# Patient Record
Sex: FEMALE | Race: BLACK OR AFRICAN AMERICAN | NOT HISPANIC OR LATINO | Employment: UNEMPLOYED | URBAN - METROPOLITAN AREA
[De-identification: names, ages, dates, MRNs, and addresses within clinical notes are randomized per-mention and may not be internally consistent; named-entity substitution may affect disease eponyms.]

---

## 2022-11-24 ENCOUNTER — HOSPITAL ENCOUNTER (EMERGENCY)
Facility: HOSPITAL | Age: 22
Discharge: HOME | End: 2022-11-24
Attending: EMERGENCY MEDICINE
Payer: MEDICAID

## 2022-11-24 VITALS
HEIGHT: 65 IN | HEART RATE: 71 BPM | RESPIRATION RATE: 18 BRPM | OXYGEN SATURATION: 99 % | TEMPERATURE: 99 F | WEIGHT: 216.38 LBS | DIASTOLIC BLOOD PRESSURE: 68 MMHG | BODY MASS INDEX: 36.05 KG/M2 | SYSTOLIC BLOOD PRESSURE: 139 MMHG

## 2022-11-24 DIAGNOSIS — S61.215A LACERATION OF LEFT RING FINGER WITHOUT FOREIGN BODY WITHOUT DAMAGE TO NAIL, INITIAL ENCOUNTER: Primary | ICD-10-CM

## 2022-11-24 PROCEDURE — 12001 RPR S/N/AX/GEN/TRNK 2.5CM/<: CPT

## 2022-11-24 PROCEDURE — 99283 EMERGENCY DEPT VISIT LOW MDM: CPT | Mod: 25

## 2022-11-24 PROCEDURE — 25000003 PHARM REV CODE 250: Performed by: NURSE PRACTITIONER

## 2022-11-24 RX ADMIN — Medication 1.5 ML: at 12:11

## 2022-11-24 NOTE — ED NOTES
Non-adherent dressing placed and covered with coban. Extra dressing materials provided to pt. DC indicated per MD. DC instructions explained to pt., who verbalized understanding. NAD, VSS, resp. E/u. AAOx4. Ambulatory out of ED with even, steady gait. Copy of DC instructions provided to registration team member to give to patient with checkout. Pt. At registration desk for checkout.

## 2022-11-24 NOTE — ED PROVIDER NOTES
HISTORY     Chief Complaint   Patient presents with    Laceration     Lac noted to tip of L 4th finger, cut with knife, bleeding controlled in triage      Review of patient's allergies indicates:  No Known Allergies     HPI   The history is provided by the patient. No  was used.   Laceration   The incident occurred just prior to arrival. Pain location: Distal aspect of left ring finger. Size: 0.25. The pain is at a severity of 3/10. The pain has been Constant since onset. She reports no foreign bodies present. Her tetanus status is UTD.    Patient reports she was cutting yams for Thanksgiving dinner when she accidentally cut the distal aspect of her left index finger  PCP: Kurt Hollingsworth Ii, MD     Past Medical History:  No past medical history on file.     Past Surgical History:  Past Surgical History:   Procedure Laterality Date    None          Family History:  Family History   Problem Relation Age of Onset    Hypertension Unknown         Social History:  Social History     Tobacco Use    Smoking status: Never    Smokeless tobacco: Not on file   Substance and Sexual Activity    Alcohol use: No    Drug use: No    Sexual activity: Not on file         ROS   Review of Systems   Constitutional:  Negative for fever.   HENT:  Negative for sore throat.    Respiratory:  Negative for shortness of breath.    Cardiovascular:  Negative for chest pain.   Gastrointestinal:  Negative for nausea.   Genitourinary:  Negative for dysuria.   Musculoskeletal:  Negative for back pain.   Skin:  Positive for wound. Negative for rash.   Neurological:  Negative for weakness.   Hematological:  Does not bruise/bleed easily.     PHYSICAL EXAM     Initial Vitals [11/24/22 1144]   BP Pulse Resp Temp SpO2   139/68 71 18 98.7 °F (37.1 °C) 99 %      MAP       --           Physical Exam    Nursing note and vitals reviewed.  Constitutional: She appears well-developed and well-nourished. She is not diaphoretic. No distress.    HENT:   Head: Normocephalic and atraumatic.   Eyes: Right eye exhibits no discharge. Left eye exhibits no discharge.   Neck: Neck supple.   Normal range of motion.  Cardiovascular:  Normal rate.           Pulmonary/Chest: No respiratory distress.   Abdominal: Abdomen is soft. She exhibits no distension.   Musculoskeletal:         General: Normal range of motion.      Cervical back: Normal range of motion and neck supple.     Neurological: She is alert and oriented to person, place, and time. She has normal strength.   Skin: Skin is warm and dry.   Approximate 0.25 cm superficial laceration noted to the distal fat pad of the left ring finger.   Psychiatric: She has a normal mood and affect. Her behavior is normal. Thought content normal.        ED COURSE   Lac Repair    Date/Time: 11/24/2022 12:54 PM  Performed by: Pa Urbano NP  Authorized by: Manuel English Jr., MD     Consent:     Consent obtained:  Verbal    Consent given by:  Patient    Risks, benefits, and alternatives were discussed: yes      Alternatives discussed: Sutures versus Dermabond discussed with patient.  Patient elected Dermabond.  Universal protocol:     Procedure explained and questions answered to patient or proxy's satisfaction: yes      Relevant documents present and verified: yes      Imaging studies available: yes      Patient identity confirmed:  Verbally with patient  Anesthesia:     Anesthesia method:  Topical application    Topical anesthetic:  LET  Laceration details:     Location: Left ring finger.    Wound length (cm): 0.25.  Exploration:     Imaging obtained: x-ray    Treatment:     Area cleansed with:  Povidone-iodine  Skin repair:     Repair method:  Tissue adhesive  Approximation:     Approximation:  Close  Repair type:     Repair type:  Simple  Post-procedure details:     Dressing:  Non-adherent dressing    Procedure completion:  Tolerated well, no immediate complications  ED ONGOING VITALS:  Vitals:    11/24/22 1144  "  BP: 139/68   Pulse: 71   Resp: 18   Temp: 98.7 °F (37.1 °C)   TempSrc: Oral   SpO2: 99%   Weight: 98.1 kg (216 lb 6.1 oz)   Height: 5' 5" (1.651 m)         ABNORMAL LAB VALUES:  Labs Reviewed - No data to display      ALL LAB VALUES:  None        RADIOLOGY STUDIES:  Imaging Results              X-Ray Finger 2 or More Views Left (Final result)  Result time 11/24/22 12:17:52      Final result by Mehran Steele MD (11/24/22 12:17:52)                   Impression:      As above.      Electronically signed by: Mehran Steele MD  Date:    11/24/2022  Time:    12:17               Narrative:    EXAMINATION:  XR FINGER 2 OR MORE VIEWS LEFT    CLINICAL HISTORY:  laceration;    TECHNIQUE:  Routine radiographs obtained.    COMPARISON:  None    FINDINGS:  Soft tissue laceration distal left hand 4th digit.    Negative for underlying fracture or foreign body.                                                The above vital signs and test results have been reviewed by the emergency provider.     ED Medications:  Current Discharge Medication List        Discharge Medications:  New Prescriptions    No medications on file       Follow-up Information       pcp of choice In 1 week.    Why: For wound re-check             [facility] - Emergency Dept..    Specialty: Emergency Medicine  Why: If symptoms worsen  Contact information:  [address] [facility] [address] [phone]                          I discussed with patient and/or family/caretaker that evaluation in the ED does not suggest any emergent or life threatening medical conditions requiring immediate intervention beyond what was provided in the ED, and I believe patient is safe for discharge. Regardless, an unremarkable evaluation in the ED does not preclude the development or presence of a serious or life threatening condition. As such, patient was instructed to return immediately for any worsening or change in current symptoms.      MEDICAL DECISION " MAKING                 CLINICAL IMPRESSION       ICD-10-CM ICD-9-CM   1. Laceration of left ring finger without foreign body without damage to nail, initial encounter  S61.215A 883.0       Disposition:   Disposition: Discharged  Condition: Stable       Pa Urbano NP  11/24/22 7425